# Patient Record
Sex: FEMALE | Race: WHITE | NOT HISPANIC OR LATINO
[De-identification: names, ages, dates, MRNs, and addresses within clinical notes are randomized per-mention and may not be internally consistent; named-entity substitution may affect disease eponyms.]

---

## 2019-11-25 ENCOUNTER — TRANSCRIPTION ENCOUNTER (OUTPATIENT)
Age: 59
End: 2019-11-25

## 2019-11-25 ENCOUNTER — INPATIENT (INPATIENT)
Facility: HOSPITAL | Age: 59
LOS: 0 days | Discharge: HOME | DRG: 74 | End: 2019-11-25
Attending: INTERNAL MEDICINE | Admitting: INTERNAL MEDICINE
Payer: COMMERCIAL

## 2019-11-25 VITALS
HEIGHT: 61 IN | OXYGEN SATURATION: 99 % | SYSTOLIC BLOOD PRESSURE: 196 MMHG | WEIGHT: 130.07 LBS | DIASTOLIC BLOOD PRESSURE: 117 MMHG | TEMPERATURE: 97 F | HEART RATE: 72 BPM | RESPIRATION RATE: 18 BRPM

## 2019-11-25 VITALS — SYSTOLIC BLOOD PRESSURE: 159 MMHG | HEART RATE: 68 BPM | DIASTOLIC BLOOD PRESSURE: 81 MMHG

## 2019-11-25 DIAGNOSIS — Z98.890 OTHER SPECIFIED POSTPROCEDURAL STATES: Chronic | ICD-10-CM

## 2019-11-25 DIAGNOSIS — E78.5 HYPERLIPIDEMIA, UNSPECIFIED: ICD-10-CM

## 2019-11-25 DIAGNOSIS — Z91.89 OTHER SPECIFIED PERSONAL RISK FACTORS, NOT ELSEWHERE CLASSIFIED: ICD-10-CM

## 2019-11-25 DIAGNOSIS — R63.8 OTHER SYMPTOMS AND SIGNS CONCERNING FOOD AND FLUID INTAKE: ICD-10-CM

## 2019-11-25 DIAGNOSIS — I73.00 RAYNAUD'S SYNDROME WITHOUT GANGRENE: ICD-10-CM

## 2019-11-25 DIAGNOSIS — F17.200 NICOTINE DEPENDENCE, UNSPECIFIED, UNCOMPLICATED: ICD-10-CM

## 2019-11-25 DIAGNOSIS — M54.9 DORSALGIA, UNSPECIFIED: ICD-10-CM

## 2019-11-25 DIAGNOSIS — F41.9 ANXIETY DISORDER, UNSPECIFIED: ICD-10-CM

## 2019-11-25 DIAGNOSIS — I10 ESSENTIAL (PRIMARY) HYPERTENSION: ICD-10-CM

## 2019-11-25 DIAGNOSIS — M25.519 PAIN IN UNSPECIFIED SHOULDER: ICD-10-CM

## 2019-11-25 DIAGNOSIS — R07.9 CHEST PAIN, UNSPECIFIED: ICD-10-CM

## 2019-11-25 LAB
ALBUMIN SERPL ELPH-MCNC: 4.4 G/DL — SIGNIFICANT CHANGE UP (ref 3.3–5)
ALP SERPL-CCNC: 75 U/L — SIGNIFICANT CHANGE UP (ref 40–120)
ALT FLD-CCNC: 14 U/L — SIGNIFICANT CHANGE UP (ref 10–45)
ANION GAP SERPL CALC-SCNC: 13 MMOL/L — SIGNIFICANT CHANGE UP (ref 5–17)
AST SERPL-CCNC: 13 U/L — SIGNIFICANT CHANGE UP (ref 10–40)
BASOPHILS # BLD AUTO: 0.09 K/UL — SIGNIFICANT CHANGE UP (ref 0–0.2)
BASOPHILS NFR BLD AUTO: 1.4 % — SIGNIFICANT CHANGE UP (ref 0–2)
BILIRUB SERPL-MCNC: 0.2 MG/DL — SIGNIFICANT CHANGE UP (ref 0.2–1.2)
BUN SERPL-MCNC: 25 MG/DL — HIGH (ref 7–23)
CALCIUM SERPL-MCNC: 9.6 MG/DL — SIGNIFICANT CHANGE UP (ref 8.4–10.5)
CHLORIDE SERPL-SCNC: 106 MMOL/L — SIGNIFICANT CHANGE UP (ref 96–108)
CO2 SERPL-SCNC: 23 MMOL/L — SIGNIFICANT CHANGE UP (ref 22–31)
CREAT SERPL-MCNC: 1.1 MG/DL — SIGNIFICANT CHANGE UP (ref 0.5–1.3)
EOSINOPHIL # BLD AUTO: 0.44 K/UL — SIGNIFICANT CHANGE UP (ref 0–0.5)
EOSINOPHIL NFR BLD AUTO: 6.8 % — HIGH (ref 0–6)
GLUCOSE SERPL-MCNC: 97 MG/DL — SIGNIFICANT CHANGE UP (ref 70–99)
HCT VFR BLD CALC: 39.1 % — SIGNIFICANT CHANGE UP (ref 34.5–45)
HGB BLD-MCNC: 13.3 G/DL — SIGNIFICANT CHANGE UP (ref 11.5–15.5)
IMM GRANULOCYTES NFR BLD AUTO: 0.3 % — SIGNIFICANT CHANGE UP (ref 0–1.5)
LYMPHOCYTES # BLD AUTO: 2.44 K/UL — SIGNIFICANT CHANGE UP (ref 1–3.3)
LYMPHOCYTES # BLD AUTO: 37.8 % — SIGNIFICANT CHANGE UP (ref 13–44)
MCHC RBC-ENTMCNC: 31.1 PG — SIGNIFICANT CHANGE UP (ref 27–34)
MCHC RBC-ENTMCNC: 34 GM/DL — SIGNIFICANT CHANGE UP (ref 32–36)
MCV RBC AUTO: 91.6 FL — SIGNIFICANT CHANGE UP (ref 80–100)
MONOCYTES # BLD AUTO: 0.59 K/UL — SIGNIFICANT CHANGE UP (ref 0–0.9)
MONOCYTES NFR BLD AUTO: 9.1 % — SIGNIFICANT CHANGE UP (ref 2–14)
NEUTROPHILS # BLD AUTO: 2.88 K/UL — SIGNIFICANT CHANGE UP (ref 1.8–7.4)
NEUTROPHILS NFR BLD AUTO: 44.6 % — SIGNIFICANT CHANGE UP (ref 43–77)
NRBC # BLD: 0 /100 WBCS — SIGNIFICANT CHANGE UP (ref 0–0)
PLATELET # BLD AUTO: 254 K/UL — SIGNIFICANT CHANGE UP (ref 150–400)
POTASSIUM SERPL-MCNC: 3.7 MMOL/L — SIGNIFICANT CHANGE UP (ref 3.5–5.3)
POTASSIUM SERPL-SCNC: 3.7 MMOL/L — SIGNIFICANT CHANGE UP (ref 3.5–5.3)
PROT SERPL-MCNC: 7.5 G/DL — SIGNIFICANT CHANGE UP (ref 6–8.3)
RBC # BLD: 4.27 M/UL — SIGNIFICANT CHANGE UP (ref 3.8–5.2)
RBC # FLD: 12.6 % — SIGNIFICANT CHANGE UP (ref 10.3–14.5)
SODIUM SERPL-SCNC: 142 MMOL/L — SIGNIFICANT CHANGE UP (ref 135–145)
TROPONIN T SERPL-MCNC: <0.01 NG/ML — SIGNIFICANT CHANGE UP (ref 0–0.01)
WBC # BLD: 6.46 K/UL — SIGNIFICANT CHANGE UP (ref 3.8–10.5)
WBC # FLD AUTO: 6.46 K/UL — SIGNIFICANT CHANGE UP (ref 3.8–10.5)

## 2019-11-25 PROCEDURE — 80053 COMPREHEN METABOLIC PANEL: CPT

## 2019-11-25 PROCEDURE — G0378: CPT

## 2019-11-25 PROCEDURE — 85025 COMPLETE CBC W/AUTO DIFF WBC: CPT

## 2019-11-25 PROCEDURE — 36415 COLL VENOUS BLD VENIPUNCTURE: CPT

## 2019-11-25 PROCEDURE — 71046 X-RAY EXAM CHEST 2 VIEWS: CPT

## 2019-11-25 PROCEDURE — 99285 EMERGENCY DEPT VISIT HI MDM: CPT

## 2019-11-25 PROCEDURE — 84484 ASSAY OF TROPONIN QUANT: CPT

## 2019-11-25 PROCEDURE — 71046 X-RAY EXAM CHEST 2 VIEWS: CPT | Mod: 26

## 2019-11-25 RX ORDER — DIAZEPAM 5 MG
10 TABLET ORAL ONCE
Refills: 0 | Status: DISCONTINUED | OUTPATIENT
Start: 2019-11-25 | End: 2019-11-25

## 2019-11-25 RX ORDER — ATORVASTATIN CALCIUM 80 MG/1
1 TABLET, FILM COATED ORAL
Qty: 0 | Refills: 0 | DISCHARGE
Start: 2019-11-25

## 2019-11-25 RX ORDER — METOPROLOL TARTRATE 50 MG
0.5 TABLET ORAL
Qty: 30 | Refills: 0
Start: 2019-11-25 | End: 2019-12-24

## 2019-11-25 RX ORDER — LOSARTAN POTASSIUM 100 MG/1
25 TABLET, FILM COATED ORAL DAILY
Refills: 0 | Status: DISCONTINUED | OUTPATIENT
Start: 2019-11-25 | End: 2019-11-25

## 2019-11-25 RX ORDER — METOPROLOL TARTRATE 50 MG
12.5 TABLET ORAL EVERY 12 HOURS
Refills: 0 | Status: DISCONTINUED | OUTPATIENT
Start: 2019-11-25 | End: 2019-11-25

## 2019-11-25 RX ORDER — ACETAMINOPHEN 500 MG
650 TABLET ORAL ONCE
Refills: 0 | Status: COMPLETED | OUTPATIENT
Start: 2019-11-25 | End: 2019-11-25

## 2019-11-25 RX ORDER — HYDROCHLOROTHIAZIDE 25 MG
25 TABLET ORAL DAILY
Refills: 0 | Status: DISCONTINUED | OUTPATIENT
Start: 2019-11-26 | End: 2019-11-25

## 2019-11-25 RX ORDER — KETOROLAC TROMETHAMINE 30 MG/ML
15 SYRINGE (ML) INJECTION ONCE
Refills: 0 | Status: DISCONTINUED | OUTPATIENT
Start: 2019-11-25 | End: 2019-11-25

## 2019-11-25 RX ORDER — CYCLOBENZAPRINE HYDROCHLORIDE 10 MG/1
1 TABLET, FILM COATED ORAL
Qty: 90 | Refills: 0
Start: 2019-11-25 | End: 2019-12-24

## 2019-11-25 RX ORDER — ASPIRIN/CALCIUM CARB/MAGNESIUM 324 MG
1 TABLET ORAL
Qty: 0 | Refills: 0 | DISCHARGE

## 2019-11-25 RX ORDER — NICOTINE POLACRILEX 2 MG
1 GUM BUCCAL DAILY
Refills: 0 | Status: DISCONTINUED | OUTPATIENT
Start: 2019-11-25 | End: 2019-11-25

## 2019-11-25 RX ORDER — ATORVASTATIN CALCIUM 80 MG/1
20 TABLET, FILM COATED ORAL AT BEDTIME
Refills: 0 | Status: DISCONTINUED | OUTPATIENT
Start: 2019-11-25 | End: 2019-11-25

## 2019-11-25 RX ORDER — TRIAMTERENE/HYDROCHLOROTHIAZID 75 MG-50MG
1 TABLET ORAL
Qty: 0 | Refills: 0 | DISCHARGE

## 2019-11-25 RX ORDER — ACETAMINOPHEN 500 MG
650 TABLET ORAL EVERY 6 HOURS
Refills: 0 | Status: DISCONTINUED | OUTPATIENT
Start: 2019-11-25 | End: 2019-11-25

## 2019-11-25 RX ORDER — ASPIRIN/CALCIUM CARB/MAGNESIUM 324 MG
81 TABLET ORAL DAILY
Refills: 0 | Status: DISCONTINUED | OUTPATIENT
Start: 2019-11-26 | End: 2019-11-25

## 2019-11-25 RX ORDER — LABETALOL HCL 100 MG
0 TABLET ORAL
Qty: 0 | Refills: 0 | DISCHARGE

## 2019-11-25 RX ORDER — CYCLOBENZAPRINE HYDROCHLORIDE 10 MG/1
5 TABLET, FILM COATED ORAL THREE TIMES A DAY
Refills: 0 | Status: DISCONTINUED | OUTPATIENT
Start: 2019-11-25 | End: 2019-11-25

## 2019-11-25 RX ORDER — LOSARTAN POTASSIUM 100 MG/1
1 TABLET, FILM COATED ORAL
Qty: 0 | Refills: 0 | DISCHARGE
Start: 2019-11-25

## 2019-11-25 RX ORDER — KETOROLAC TROMETHAMINE 30 MG/ML
15 SYRINGE (ML) INJECTION EVERY 6 HOURS
Refills: 0 | Status: DISCONTINUED | OUTPATIENT
Start: 2019-11-25 | End: 2019-11-25

## 2019-11-25 RX ORDER — DIAZEPAM 5 MG
5 TABLET ORAL ONCE
Refills: 0 | Status: DISCONTINUED | OUTPATIENT
Start: 2019-11-25 | End: 2019-11-25

## 2019-11-25 RX ADMIN — LOSARTAN POTASSIUM 25 MILLIGRAM(S): 100 TABLET, FILM COATED ORAL at 18:20

## 2019-11-25 RX ADMIN — CYCLOBENZAPRINE HYDROCHLORIDE 5 MILLIGRAM(S): 10 TABLET, FILM COATED ORAL at 20:45

## 2019-11-25 RX ADMIN — Medication 15 MILLIGRAM(S): at 18:56

## 2019-11-25 RX ADMIN — Medication 650 MILLIGRAM(S): at 12:45

## 2019-11-25 RX ADMIN — Medication 12.5 MILLIGRAM(S): at 18:20

## 2019-11-25 RX ADMIN — Medication 650 MILLIGRAM(S): at 18:20

## 2019-11-25 RX ADMIN — Medication 15 MILLIGRAM(S): at 15:16

## 2019-11-25 RX ADMIN — Medication 650 MILLIGRAM(S): at 18:56

## 2019-11-25 RX ADMIN — Medication 15 MILLIGRAM(S): at 18:20

## 2019-11-25 RX ADMIN — Medication 650 MILLIGRAM(S): at 13:18

## 2019-11-25 RX ADMIN — Medication 5 MILLIGRAM(S): at 12:45

## 2019-11-25 RX ADMIN — Medication 15 MILLIGRAM(S): at 14:55
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
## 2019-11-25 NOTE — DISCHARGE NOTE PROVIDER - CARE PROVIDER_API CALL
Timothy Funes)  Internal Medicine  [address] [place] [id number]  Phone: [phone]  Fax: [phone]  Established Patient  Follow Up Time: Timothy Funes)  Internal Medicine  [address] [place] [id number]  Phone: [phone]  Fax: [phone]  Established Patient  Follow Up Time: 1-3 days

## 2019-11-25 NOTE — ED PROVIDER NOTE - CLINICAL SUMMARY MEDICAL DECISION MAKING FREE TEXT BOX
59F PMH HTN p/w ?EKG changes. Pt w/ L upper back/shoulder blade pain, radiating in "straight line" up L occiput, x3d, intermittent, worse/better w/ certain positions/pressure points/massage, relief w/ occasional valium/ibuprofen. Awoke w/ same pain today so went to PMD who referred to ED 2/2 slight t-wave flattening v4-6 when compared to prior. Does not have HA anywhere else other than L occiput. No other systemic symptoms. Hypertensive, other vitals wnl. Exam as above. Very well appearing. EKG is grossly unchanged.   ddx: Likely radiculopathy. Less likely ACS. Clinically no acute intracranial pathology, PE, tamponade, dissection, PTX, perf, myocarditis, mediastinitis.   CBC, cmp, trop, CXR, symptom control, d/w dr. riggins.  HTN: Pt states ~1mo ago her BP was 200/100s. Very low suspicion for acute hypertension related pathology.

## 2019-11-25 NOTE — H&P ADULT - ASSESSMENT
60yo F w PMHx of HTN and HLD presenting with L upper back/shoulder back pain radiating to posterior occipital area for 4 days. EKG NSR and trops negative, less likely ACS, BP different between both arms less than 20 in both arms thus less likely aortic dissection, most likely radiculopathy. Admitted to [facility] for further management,

## 2019-11-25 NOTE — DISCHARGE NOTE PROVIDER - NSDCFUADDINST_GEN_ALL_CORE_FT
Please follow up with your primary care provider within 2 weeks of this hospitalization. Please call his office to make an appointment. Please follow up with your primary care provider within 2 weeks of this hospitalization. Please call his office to make an appointment.    Please bring all discharge paperwork to all follow up appointments.

## 2019-11-25 NOTE — H&P ADULT - NSHPLABSRESULTS_GEN_ALL_CORE
LABS:                        13.3   6.46  )-----------( 254      ( 25 Nov 2019 12:44 )             39.1     11-25    142  |  106  |  25<H>  ----------------------------<  97  3.7   |  23  |  1.10    Ca    9.6      25 Nov 2019 12:44    TPro  7.5  /  Alb  4.4  /  TBili  0.2  /  DBili  x   /  AST  13  /  ALT  14  /  AlkPhos  75  11-25        CAPILLARY BLOOD GLUCOSE      RADIOLOGY & ADDITIONAL TESTS: reviewed

## 2019-11-25 NOTE — DISCHARGE NOTE PROVIDER - NSDCCPCAREPLAN_GEN_ALL_CORE_FT
PRINCIPAL DISCHARGE DIAGNOSIS  Diagnosis: Radicular pain of shoulder  Assessment and Plan of Treatment: You came in with shoulder pain. This was managed with Tyelenol and a new medication called Flexiril. You can take this medication up to 3 times a day as needed. Please follow up with Dr. Funes tomorrow.      SECONDARY DISCHARGE DIAGNOSES  Diagnosis: Hypertension  Assessment and Plan of Treatment: You were previously diagnosed with high blood pressure. Your blood pressure was elevated while you were in the hospital. You were started on two additional medications, Lopressor 12.5 mg (two times a day) and Losartan 25mg (once a day). Please continue on your home medications at this time and follow up with Dr. Funes tomorrow for further surveillance and management of your high blood pressure and to determine if you should continue taking these medications.

## 2019-11-25 NOTE — DISCHARGE NOTE PROVIDER - NSDCMRMEDTOKEN_GEN_ALL_CORE_FT
aspirin 81 mg oral tablet: 1 tab(s) orally once a day  propranolol 80 mg oral capsule, extended release: 1 cap(s) orally once a day  triamterene-hydrochlorothiazide 37.5 mg-25 mg oral capsule: 1 cap(s) orally once a day aspirin 81 mg oral tablet: 1 tab(s) orally once a day  atorvastatin 20 mg oral tablet: 1 tab(s) orally once a day (at bedtime)  propranolol 80 mg oral capsule, extended release: 1 cap(s) orally once a day  triamterene-hydrochlorothiazide 37.5 mg-25 mg oral capsule: 1 cap(s) orally once a day aspirin 81 mg oral tablet: 1 tab(s) orally once a day  atorvastatin 20 mg oral tablet: 1 tab(s) orally once a day (at bedtime)  cyclobenzaprine 5 mg oral tablet: 1 tab(s) orally 3 times a day, As needed, Muscle Spasm  hydroCHLOROthiazide 25 mg oral tablet: 1 tab(s) orally once a day  losartan 25 mg oral tablet: 1 tab(s) orally once a day  metoprolol tartrate 25 mg oral tablet: 0.5 tab(s) orally 2 times a day   propranolol 80 mg oral capsule, extended release: 1 cap(s) orally once a day  triamterene-hydrochlorothiazide 37.5 mg-25 mg oral capsule: 1 cap(s) orally once a day

## 2019-11-25 NOTE — ED PROVIDER NOTE - PROGRESS NOTE DETAILS
[place]: labs grossly wnl. cxr pending. initially I had discussed w/ dr. riggins w/ plan for symptom control and reassessment.   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 Dr. Elizabeth was called to eval pt in ED. Given pt's high BP and several cardiac risk factors, requesting admission to his service for further care. dr. elizabeth d/w dr. riggins who is agreeable. pt updated. still in pain but improved. will admit for further care.

## 2019-11-25 NOTE — DISCHARGE NOTE PROVIDER - PROVIDER TOKENS
PROVIDER:[TOKEN:[[id number]],ESTABLISHEDPATIENT:[T]] PROVIDER:[TOKEN:[[id number]],FOLLOWUP:[1-3 days],ESTABLISHEDPATIENT:[T]]

## 2019-11-25 NOTE — ED ADULT NURSE NOTE - OBJECTIVE STATEMENT
pt c/o " The worst headache of my life" that started suddenly Friday. pt states  taking Motrin & Valium with some relief, when pain meds wear off pain starts again. pt denies any n,v, motor weakness, numbness, tingling, or recent injury/fall.

## 2019-11-25 NOTE — ED PROVIDER NOTE - OTHER FINDINGS
EKG that PMD sent in w/ very slight t wave flattening v4-6 when compared to prior. grossly unchanged.

## 2019-11-25 NOTE — H&P ADULT - PROBLEM SELECTOR PLAN 2
Pt with hx of HTN on home Propranolol ER 80mg and Triamterene-HCTZ 37.5-25mg  -start Valsartan   -start Metoprolol tartrate 12.5mg q12h  -pt with elevated ASCVD score, c/w home ASA 81mg Pt with hx of HTN on home Propranolol ER 80mg and Triamterene-HCTZ 37.5-25mg  -start Losartan 20mg daily   -start Metoprolol tartrate 12.5mg q12h  -pt with elevated ASCVD score, c/w home ASA 81mg
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
## 2019-11-25 NOTE — H&P ADULT - PROBLEM SELECTOR PLAN 3
Pt with hx of hyperlipidemia  -start Rosurvastatin 10mg daily Pt with hx of hyperlipidemia  -start atorvastatin 20mg daily

## 2019-11-25 NOTE — ED ADULT TRIAGE NOTE - CHIEF COMPLAINT QUOTE
pt reports upper back pain rad up shoulder and back of head since Friday. Describes "jovanny horse" in shoulder and generalized headache 7/10. Denies CP. Hx of HTN, took meds today.

## 2019-11-25 NOTE — H&P ADULT - PROBLEM SELECTOR PLAN 5
F: none  E: replete electrolytes K< 4, Mg <2  N: DASH/TLC diet  DVT PPx: SCDs   Code status: Full Code    Dispo; [facility] F: none  E: replete electrolytes K< 4, Mg <2  N: DASH/TLC diet  DVT PPx: SCDs, padua score 0  Code status: Full Code    Dispo; [facility]

## 2019-11-25 NOTE — H&P ADULT - PROBLEM SELECTOR PLAN 4
Pt with anxiety on Valium 10mg PRN  -no intervention needed at this time  -pt to follow up as outpatient with her PCP

## 2019-11-25 NOTE — H&P ADULT - PROBLEM SELECTOR PLAN 6
1) PCP Contacted on Admission: (Y/N) --> Name & Phone #: Dr. Funes   2) Date of Contact with PCP: 11/25/2019  3) PCP Contacted at Discharge: TBD  4) Summary of Handoff Given to PCP: TBD   5) Post-Discharge Appointment Date: TBD

## 2019-11-25 NOTE — H&P ADULT - NSHPPHYSICALEXAM_GEN_ALL_CORE
PHYSICAL EXAM:  GENERAL: NAD, well-groomed, well-developed  HEAD:  Atraumatic, Normocephalic  EYES: EOMI, PERRLA, conjunctiva and sclera clear  ENMT: No tonsillar erythema, exudates, or enlargement; Moist mucous membranes  NECK: Supple, No JVD, Normal thyroid  HEART: Regular rate and rhythm; No murmurs, rubs, or gallops  RESPIRATORY: CTA B/L, No W/R/R  ABDOMEN: Soft, Nontender, Nondistended; Bowel sounds present  Shoulders: TTP left shoulder   EXTREMITIES:  2+ Peripheral Pulses, No clubbing, cyanosis, or edema  SKIN: warm, dry, normal color, no rash or abnormal lesions  Neurological:   	- Mental Status: AAOx3; speech is fluent with intact naming, repetition, and comprehension  	- Cranial Nerves II -XII:  II: PERRLA; visual fields are full to confrontation  III, IV, VI: EOMI, no nystagmus appreciated  V: facial sensation intact to light touch V1-V3 b/l  VII: no ptosis, no facial droop, symmetric eyebrow raise and closure  VIII: hearing intact to finger rub b/l  IX, X: uvula is midline, soft palate rises symmetrically  XI: head turning and shoulder shrug intact b/l  XII: tongue protrusion midline  - Motor: strength is 5/5 b/l LLE & RLE, 5/5 b/l LUE & RUE. normal muscle bulk and tone throughout, no pronator drift  -Sensation: Intact to light touch, proprioception, and pinprick.  No neglect.   -Coordination: No dysmetria on finger-to-nose and heel-to-shin.  No clumsiness.  -Reflexes: 2+ in upper and lower extremities, downgoing toes bilaterally  -Gait: not assessed
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
## 2019-11-25 NOTE — H&P ADULT - PROBLEM SELECTOR PLAN 1
Pt presenting with left shoulder/scapular pain radiating to posterior occipital area for 4 days. Pain relieved with Valium. Denies recent trauma or exercise. Denies CP. EKG NSR and trops neg thus less likely ACS. BP different in both arms less than 20, and no widened mediastinum on CXR, less likely aortic dissection. Pain is most likely radiculopathic in nature.   -pain control with Toradol 15mg [id number] PRN for severe pain and Tylenol 650mg [id number] for moderate pain, cyclobenzaprine for muscle spasm PRN  -continue to monitor pain Pt presenting with left shoulder/scapular pain radiating to posterior occipital area for 4 days. Pain relieved with Valium. Denies recent trauma or exercise. Denies CP. EKG NSR and trops neg thus less likely ACS. BP different in both arms less than 20, and no widened mediastinum on CXR, less likely aortic dissection. Pain is most likely radiculopathic in nature.   -pain control with Toradol 15mg [id number] PRN for severe pain and Tylenol 650mg [id number] for moderate pain, cyclobenzaprine for muscle spasm PRN  -for radiculopathy start Gabapentin 300mg at bedtime qd  -continue to monitor pain

## 2019-11-25 NOTE — H&P ADULT - NSHPSOCIALHISTORY_GEN_ALL_CORE
, works as medical assistant, current smoker (40 pack year), denies EtOH, reports occasional marijuana use

## 2019-11-25 NOTE — CONSULT NOTE ADULT - SUBJECTIVE AND OBJECTIVE BOX
Back and neck pain about the same for the past 48 hours. Her troponin and ekg this afternoon were unremarkable and show resolution of her flipped T waves on admission. WIll discharge and schedule outpatient workup for ischemia.

## 2019-11-25 NOTE — ED PROVIDER NOTE - PHYSICAL EXAMINATION
no LE edema, normal equal distal pulses.  no nuchal rigidity  PERRL, EOMI, no nystagmus. CN intact. Strength 5/5. no spinal ttp, FROM all extremities. Steady unassisted gait. No pronator drift. Sensation intact. No carotid bruits. no overlying skin changes.

## 2019-11-25 NOTE — DISCHARGE NOTE NURSING/CASE MANAGEMENT/SOCIAL WORK - PATIENT PORTAL LINK FT
You can access the FollowMyHealth Patient Portal offered by [facility] by registering at the following website: [email or web address]. By joining [employer]’s FollowMyHealth portal, you will also be able to view your health information using other applications (apps) compatible with our system.

## 2019-11-25 NOTE — ED PROVIDER NOTE - OBJECTIVE STATEMENT
59F PMH HTN p/w ?EKG changes. Pt w/ L upper back/shoulder blade pain, radiating in "straight line" up L occiput, x3d, intermittent, worse/better w/ certain positions/pressure points/massage, relief w/ occasional valium/ibuprofen. Awoke w/ same pain today so went to PMD who referred to ED 2/2 slight t-wave flattening v4-6 when compared to prior. Does not have HA anywhere else other than L occiput. Denies associated cp, SOB, NVD, lightheaded, diaphoresis, palpitations, cough/rhinorrhea, black/bloody stool, LE pain/swelling, focal weakness/numbness, recent travel/immobilization, abd pain, urinary complaints, f/c.

## 2019-11-29 DIAGNOSIS — M54.13 RADICULOPATHY, CERVICOTHORACIC REGION: ICD-10-CM

## 2019-11-29 DIAGNOSIS — F41.9 ANXIETY DISORDER, UNSPECIFIED: ICD-10-CM

## 2019-11-29 DIAGNOSIS — M25.512 PAIN IN LEFT SHOULDER: ICD-10-CM

## 2019-11-29 DIAGNOSIS — E78.5 HYPERLIPIDEMIA, UNSPECIFIED: ICD-10-CM

## 2019-11-29 DIAGNOSIS — I10 ESSENTIAL (PRIMARY) HYPERTENSION: ICD-10-CM

## 2019-11-29 DIAGNOSIS — I73.00 RAYNAUD'S SYNDROME WITHOUT GANGRENE: ICD-10-CM

## 2020-01-02 PROBLEM — I73.00 RAYNAUD'S SYNDROME WITHOUT GANGRENE: Chronic | Status: ACTIVE | Noted: 2019-11-25

## 2020-01-02 PROBLEM — I10 ESSENTIAL (PRIMARY) HYPERTENSION: Chronic | Status: ACTIVE | Noted: 2019-11-25

## 2020-01-27 ENCOUNTER — APPOINTMENT (OUTPATIENT)
Dept: ORTHOPEDIC SURGERY | Facility: CLINIC | Age: 60
End: 2020-01-27
Payer: COMMERCIAL

## 2020-01-27 VITALS — BODY MASS INDEX: 23.03 KG/M2 | WEIGHT: 122 LBS | RESPIRATION RATE: 16 BRPM | HEIGHT: 61 IN

## 2020-01-27 DIAGNOSIS — S53.20XA TRAUMATIC RUPTURE OF UNSPECIFIED RADIAL COLLATERAL LIGAMENT, INITIAL ENCOUNTER: ICD-10-CM

## 2020-01-27 DIAGNOSIS — F17.200 NICOTINE DEPENDENCE, UNSPECIFIED, UNCOMPLICATED: ICD-10-CM

## 2020-01-27 DIAGNOSIS — I10 ESSENTIAL (PRIMARY) HYPERTENSION: ICD-10-CM

## 2020-01-27 DIAGNOSIS — Z78.9 OTHER SPECIFIED HEALTH STATUS: ICD-10-CM

## 2020-01-27 PROBLEM — Z00.00 ENCOUNTER FOR PREVENTIVE HEALTH EXAMINATION: Status: ACTIVE | Noted: 2020-01-27

## 2020-01-27 PROCEDURE — 99203 OFFICE O/P NEW LOW 30 MIN: CPT

## 2020-01-27 PROCEDURE — 73130 X-RAY EXAM OF HAND: CPT | Mod: 50

## 2023-10-04 NOTE — ED ADULT TRIAGE NOTE - MODE OF ARRIVAL
EMS Quality 111:Pneumonia Vaccination Status For Older Adults: Patient did not receive any pneumococcal conjugate or polysaccharide vaccine on or after their 60th birthday and before the end of the measurement period

## 2024-01-02 NOTE — PROGRESS NOTE ADULT - PROVIDER SPECIALTY LIST ADULT
Critical Care
[FreeTextEntry1] : give fluids and antipyretics, rto if no improvement or condition worsens  RVP declined

## 2024-10-04 NOTE — ED ADULT NURSE REASSESSMENT NOTE - NS ED NURSE REASSESS COMMENT FT1
DRAFT - NOT FINAL     Cecilio Leyva notified us that his warfarin therapy is now being managed by Dr. Braeden Hernandez at [facility]. We received verbal confirmation from      at Dr. Velasquez's office, so at this time, we will defer management to him, per patient request.        Thank you,    Zeina Galeano [facility]  [address] [place] [address]. [address]  [place] [address]   Phone: [phone]  Fax: [phone]  Hours: Mon - Fri 8:00am - 4:30pm (phone not answered after 4:00pm)     pt in no acute distress. safety maintained. pending bed on a floor. family at bedside. will continue to monitor.